# Patient Record
Sex: MALE | Race: WHITE | URBAN - METROPOLITAN AREA
[De-identification: names, ages, dates, MRNs, and addresses within clinical notes are randomized per-mention and may not be internally consistent; named-entity substitution may affect disease eponyms.]

---

## 2023-04-01 ENCOUNTER — CLAIMS CREATED FROM THE CLAIM WINDOW (OUTPATIENT)
Dept: URBAN - METROPOLITAN AREA MEDICAL CENTER 31 | Facility: MEDICAL CENTER | Age: 78
End: 2023-04-01

## 2023-04-01 ENCOUNTER — CLAIMS CREATED FROM THE CLAIM WINDOW (OUTPATIENT)
Dept: URBAN - METROPOLITAN AREA MEDICAL CENTER 31 | Facility: MEDICAL CENTER | Age: 78
End: 2023-04-01
Payer: MEDICARE

## 2023-04-01 DIAGNOSIS — K81.0 ACALCULOUS CHOLECYSTITIS: ICD-10-CM

## 2023-04-01 DIAGNOSIS — R10.84 ABDOMINAL CRAMPING, GENERALIZED: ICD-10-CM

## 2023-04-01 PROCEDURE — G8427 DOCREV CUR MEDS BY ELIG CLIN: HCPCS | Performed by: STUDENT IN AN ORGANIZED HEALTH CARE EDUCATION/TRAINING PROGRAM

## 2023-04-01 PROCEDURE — 99222 1ST HOSP IP/OBS MODERATE 55: CPT | Performed by: STUDENT IN AN ORGANIZED HEALTH CARE EDUCATION/TRAINING PROGRAM

## 2023-04-02 ENCOUNTER — CLAIMS CREATED FROM THE CLAIM WINDOW (OUTPATIENT)
Dept: URBAN - METROPOLITAN AREA MEDICAL CENTER 31 | Facility: MEDICAL CENTER | Age: 78
End: 2023-04-02

## 2023-04-02 ENCOUNTER — CLAIMS CREATED FROM THE CLAIM WINDOW (OUTPATIENT)
Dept: URBAN - METROPOLITAN AREA MEDICAL CENTER 31 | Facility: MEDICAL CENTER | Age: 78
End: 2023-04-02
Payer: MEDICARE

## 2023-04-02 DIAGNOSIS — K26.4 BLEEDING DUODENAL ULCER: ICD-10-CM

## 2023-04-02 DIAGNOSIS — K83.1 AMPULLA OF VATER OBSTRUCTION SYNDROME: ICD-10-CM

## 2023-04-02 PROCEDURE — 43261 ENDO CHOLANGIOPANCREATOGRAPH: CPT | Performed by: INTERNAL MEDICINE

## 2023-04-03 ENCOUNTER — CLAIMS CREATED FROM THE CLAIM WINDOW (OUTPATIENT)
Dept: URBAN - METROPOLITAN AREA MEDICAL CENTER 31 | Facility: MEDICAL CENTER | Age: 78
End: 2023-04-03

## 2023-04-03 ENCOUNTER — CLAIMS CREATED FROM THE CLAIM WINDOW (OUTPATIENT)
Dept: URBAN - METROPOLITAN AREA MEDICAL CENTER 31 | Facility: MEDICAL CENTER | Age: 78
End: 2023-04-03
Payer: MEDICARE

## 2023-04-03 DIAGNOSIS — R10.13 ABDOMINAL DISCOMFORT, EPIGASTRIC: ICD-10-CM

## 2023-04-03 DIAGNOSIS — K81.0 ACUTE CHOLECYSTITIS: ICD-10-CM

## 2023-04-03 DIAGNOSIS — R74.8 ABNORMAL ALKALINE PHOSPHATASE TEST: ICD-10-CM

## 2023-04-03 PROCEDURE — 99232 SBSQ HOSP IP/OBS MODERATE 35: CPT | Performed by: PHYSICIAN ASSISTANT

## 2023-05-25 ENCOUNTER — LAB OUTSIDE AN ENCOUNTER (OUTPATIENT)
Dept: URBAN - METROPOLITAN AREA CLINIC 115 | Facility: CLINIC | Age: 78
End: 2023-05-25

## 2023-05-25 ENCOUNTER — OFFICE VISIT (OUTPATIENT)
Dept: URBAN - METROPOLITAN AREA CLINIC 115 | Facility: CLINIC | Age: 78
End: 2023-05-25
Payer: MEDICARE

## 2023-05-25 VITALS
DIASTOLIC BLOOD PRESSURE: 78 MMHG | SYSTOLIC BLOOD PRESSURE: 143 MMHG | WEIGHT: 214.2 LBS | BODY MASS INDEX: 32.46 KG/M2 | HEIGHT: 68 IN | HEART RATE: 68 BPM | TEMPERATURE: 97.8 F

## 2023-05-25 DIAGNOSIS — K26.9 DUODENAL ULCER: ICD-10-CM

## 2023-05-25 DIAGNOSIS — K27.9 PEPTIC ULCER: ICD-10-CM

## 2023-05-25 DIAGNOSIS — R19.7 DIARRHEA, UNSPECIFIED TYPE: ICD-10-CM

## 2023-05-25 PROBLEM — 13200003: Status: ACTIVE | Noted: 2023-05-25

## 2023-05-25 PROCEDURE — G8427 DOCREV CUR MEDS BY ELIG CLIN: HCPCS | Performed by: INTERNAL MEDICINE

## 2023-05-25 PROCEDURE — 99204 OFFICE O/P NEW MOD 45 MIN: CPT | Performed by: INTERNAL MEDICINE

## 2023-05-25 PROCEDURE — G8417 CALC BMI ABV UP PARAM F/U: HCPCS | Performed by: INTERNAL MEDICINE

## 2023-05-25 PROCEDURE — G9903 PT SCRN TBCO ID AS NON USER: HCPCS | Performed by: INTERNAL MEDICINE

## 2023-05-25 RX ORDER — AMLODIPINE BESYLATE 5 MG/1
1 TABLET TABLET ORAL ONCE A DAY
Status: ACTIVE | COMMUNITY

## 2023-05-25 RX ORDER — LOSARTAN POTASSIUM 100 MG/1
1 TABLET TABLET ORAL ONCE A DAY
Status: ACTIVE | COMMUNITY

## 2023-05-25 RX ORDER — OXYCODONE HYDROCHLORIDE AND ACETAMINOPHEN 10; 325 MG/1; MG/1
1 TABLET AS NEEDED TABLET ORAL
Status: ACTIVE | COMMUNITY

## 2023-05-25 RX ORDER — ATENOLOL 50 MG/1
1 TABLET TABLET ORAL ONCE A DAY
Status: ACTIVE | COMMUNITY

## 2023-05-25 RX ORDER — ROSUVASTATIN CALCIUM 20 MG/1
1 TABLET TABLET, FILM COATED ORAL ONCE A DAY
Status: ACTIVE | COMMUNITY

## 2023-05-25 RX ORDER — EMPAGLIFLOZIN 25 MG/1
1 TABLET TABLET, FILM COATED ORAL ONCE A DAY
Status: ACTIVE | COMMUNITY

## 2023-05-25 RX ORDER — FENOFIBRATE 160 MG/1
1 TABLET TABLET ORAL ONCE A DAY
Status: ACTIVE | COMMUNITY

## 2023-05-25 RX ORDER — CHOLESTYRAMINE 4 G/9G
1 PACKET MIXED WITH WATER OR NON-CARBONATED DRINK POWDER, FOR SUSPENSION ORAL ONCE A DAY
Qty: 90 | Refills: 1 | OUTPATIENT
Start: 2023-05-25

## 2023-05-25 RX ORDER — GLIPIZIDE 10 MG/1
1 TABLET 30 MINUTES BEFORE BREAKFAST TABLET ORAL ONCE A DAY
Status: ACTIVE | COMMUNITY

## 2023-05-25 RX ORDER — UBIDECARENONE 100 MG
AS DIRECTED CAPSULE ORAL
Status: ACTIVE | COMMUNITY

## 2023-05-25 RX ORDER — PANTOPRAZOLE SODIUM 40 MG/1
1 TABLET TABLET, DELAYED RELEASE ORAL TWICE A DAY
Qty: 180 TABLET | Refills: 1 | OUTPATIENT
Start: 2023-05-25

## 2023-05-25 RX ORDER — INSULIN GLARGINE AND LIXISENATIDE 100; 33 U/ML; UG/ML
AS DIRECTED INJECTION, SOLUTION SUBCUTANEOUS
Status: ACTIVE | COMMUNITY

## 2023-05-25 RX ORDER — TAMSULOSIN HYDROCHLORIDE 0.4 MG/1
1 CAPSULE CAPSULE ORAL ONCE A DAY
Status: ACTIVE | COMMUNITY

## 2023-05-25 RX ORDER — METFORMIN HYDROCHLORIDE 1000 MG/1
1 TABLET WITH A MEAL TABLET, FILM COATED ORAL ONCE A DAY
Status: ACTIVE | COMMUNITY

## 2023-05-25 NOTE — HPI-TODAY'S VISIT:
5/25/2023 Patient is a 77 year old  male who presents for a peptic ulcer. Patient states that he is doing fine and denies any stomach pain or any other issues. Patient states that after the hospital, he has felt a lot better. Patient states that he only had 10 days worth of pantoprazole after being at the hospital and that he has not had any since. Patient's wife states that after leaving the hospital he had a diarrhea for a couple of days. Patient has carpal tunnel. He denies having a colonoscopy in the past.

## 2023-06-01 LAB
A/G RATIO: 2
ABSOLUTE BASOPHILS: 83
ABSOLUTE EOSINOPHILS: 138
ABSOLUTE LYMPHOCYTES: 2008
ABSOLUTE MONOCYTES: 718
ABSOLUTE NEUTROPHILS: 3954
ALBUMIN: 4.3
ALKALINE PHOSPHATASE: 38
ALT (SGPT): 27
AST (SGOT): 15
BASOPHILS: 1.2
BILIRUBIN, TOTAL: 0.4
BUN/CREATININE RATIO: (no result)
BUN: 16
CALCIUM: 9.4
CARBON DIOXIDE, TOTAL: 25
CHLORIDE: 104
CREATININE: 0.8
EGFR: 92
EOSINOPHILS: 2
GASTRIN, SERUM: 34
GLOBULIN, TOTAL: 2.2
GLUCOSE: 110
HEMATOCRIT: 42.9
HEMOGLOBIN: 14.5
LYMPHOCYTES: 29.1
MCH: 30.7
MCHC: 33.8
MCV: 90.9
MONOCYTES: 10.4
MPV: 10.3
NEUTROPHILS: 57.3
PLATELET COUNT: 233
POTASSIUM: 4.2
PROTEIN, TOTAL: 6.5
RDW: 12.6
RED BLOOD CELL COUNT: 4.72
SODIUM: 140
WHITE BLOOD CELL COUNT: 6.9

## 2023-06-13 ENCOUNTER — OFFICE VISIT (OUTPATIENT)
Dept: URBAN - METROPOLITAN AREA MEDICAL CENTER 24 | Facility: MEDICAL CENTER | Age: 78
End: 2023-06-13
Payer: MEDICARE

## 2023-06-13 DIAGNOSIS — K83.8 ACQUIRED DILATION OF BILE DUCT: ICD-10-CM

## 2023-06-13 PROCEDURE — 43262 ENDO CHOLANGIOPANCREATOGRAPH: CPT | Performed by: INTERNAL MEDICINE

## 2023-06-13 PROCEDURE — 43264 ERCP REMOVE DUCT CALCULI: CPT | Performed by: INTERNAL MEDICINE

## 2023-07-13 ENCOUNTER — OFFICE VISIT (OUTPATIENT)
Dept: URBAN - METROPOLITAN AREA CLINIC 115 | Facility: CLINIC | Age: 78
End: 2023-07-13
Payer: MEDICARE

## 2023-07-13 ENCOUNTER — WEB ENCOUNTER (OUTPATIENT)
Dept: URBAN - METROPOLITAN AREA CLINIC 115 | Facility: CLINIC | Age: 78
End: 2023-07-13

## 2023-07-13 VITALS
SYSTOLIC BLOOD PRESSURE: 135 MMHG | HEART RATE: 76 BPM | BODY MASS INDEX: 32.46 KG/M2 | HEIGHT: 68 IN | WEIGHT: 214.2 LBS | DIASTOLIC BLOOD PRESSURE: 71 MMHG | TEMPERATURE: 97.7 F

## 2023-07-13 DIAGNOSIS — R19.7 DIARRHEA, UNSPECIFIED TYPE: ICD-10-CM

## 2023-07-13 DIAGNOSIS — K26.9 DUODENAL ULCER: ICD-10-CM

## 2023-07-13 PROCEDURE — 99214 OFFICE O/P EST MOD 30 MIN: CPT | Performed by: INTERNAL MEDICINE

## 2023-07-13 RX ORDER — ROSUVASTATIN CALCIUM 20 MG/1
1 TABLET TABLET, FILM COATED ORAL ONCE A DAY
Status: ACTIVE | COMMUNITY

## 2023-07-13 RX ORDER — TAMSULOSIN HYDROCHLORIDE 0.4 MG/1
1 CAPSULE CAPSULE ORAL ONCE A DAY
Status: ACTIVE | COMMUNITY

## 2023-07-13 RX ORDER — UBIDECARENONE 100 MG
AS DIRECTED CAPSULE ORAL
Status: ACTIVE | COMMUNITY

## 2023-07-13 RX ORDER — PANTOPRAZOLE SODIUM 40 MG/1
1 TABLET TABLET, DELAYED RELEASE ORAL ONCE A DAY
Qty: 90 | Refills: 1 | OUTPATIENT
Start: 2023-07-13

## 2023-07-13 RX ORDER — INSULIN GLARGINE AND LIXISENATIDE 100; 33 U/ML; UG/ML
AS DIRECTED INJECTION, SOLUTION SUBCUTANEOUS
Status: ACTIVE | COMMUNITY

## 2023-07-13 RX ORDER — METFORMIN HYDROCHLORIDE 1000 MG/1
1 TABLET WITH A MEAL TABLET, FILM COATED ORAL ONCE A DAY
Status: ACTIVE | COMMUNITY

## 2023-07-13 RX ORDER — PANTOPRAZOLE SODIUM 40 MG/1
1 TABLET TABLET, DELAYED RELEASE ORAL TWICE A DAY
Qty: 180 TABLET | Refills: 1 | Status: ACTIVE | COMMUNITY
Start: 2023-05-25

## 2023-07-13 RX ORDER — LOSARTAN POTASSIUM 100 MG/1
1 TABLET TABLET ORAL ONCE A DAY
Status: ACTIVE | COMMUNITY

## 2023-07-13 RX ORDER — FENOFIBRATE 160 MG/1
1 TABLET TABLET ORAL ONCE A DAY
Status: ACTIVE | COMMUNITY

## 2023-07-13 RX ORDER — OXYCODONE HYDROCHLORIDE AND ACETAMINOPHEN 10; 325 MG/1; MG/1
1 TABLET AS NEEDED TABLET ORAL
Status: ACTIVE | COMMUNITY

## 2023-07-13 RX ORDER — AMLODIPINE BESYLATE 5 MG/1
1 TABLET TABLET ORAL ONCE A DAY
Status: ACTIVE | COMMUNITY

## 2023-07-13 RX ORDER — CHOLESTYRAMINE 4 G/9G
1 PACKET MIXED WITH WATER OR NON-CARBONATED DRINK POWDER, FOR SUSPENSION ORAL ONCE A DAY
Qty: 90 | Refills: 1 | Status: ACTIVE | COMMUNITY
Start: 2023-05-25

## 2023-07-13 RX ORDER — ATENOLOL 50 MG/1
1 TABLET TABLET ORAL ONCE A DAY
Status: ACTIVE | COMMUNITY

## 2023-07-13 RX ORDER — GLIPIZIDE 10 MG/1
1 TABLET 30 MINUTES BEFORE BREAKFAST TABLET ORAL ONCE A DAY
Status: ACTIVE | COMMUNITY

## 2023-07-13 RX ORDER — EMPAGLIFLOZIN 25 MG/1
1 TABLET TABLET, FILM COATED ORAL ONCE A DAY
Status: ACTIVE | COMMUNITY

## 2023-07-13 NOTE — HPI-TODAY'S VISIT:
5/25/2023 Patient is a 77 year old  male who presents for a peptic ulcer. Patient states that he is doing fine and denies any stomach pain or any other issues. Patient states that after the hospital, he has felt a lot better. Patient states that he only had 10 days worth of pantoprazole after being at the hospital and that he has not had any since. Patient's wife states that after leaving the hospital he had a diarrhea for a couple of days. Patient has carpal tunnel. He denies having a colonoscopy in the past. 7/13/2023 Patient came for follow-up today denies having abdominal pain denies of any bloating denies of any melena hematochezia able to tolerate the diet no weight loss he underwent ERCP last month the previously noted ulcer healed.  He denies of any chronic NSAID use.  Diarrhea also improved.

## 2023-10-10 ENCOUNTER — TELEPHONE ENCOUNTER (OUTPATIENT)
Dept: URBAN - METROPOLITAN AREA CLINIC 115 | Facility: CLINIC | Age: 78
End: 2023-10-10

## 2024-01-11 ENCOUNTER — OFFICE VISIT (OUTPATIENT)
Dept: URBAN - METROPOLITAN AREA CLINIC 115 | Facility: CLINIC | Age: 79
End: 2024-01-11
Payer: MEDICARE

## 2024-01-11 ENCOUNTER — DASHBOARD ENCOUNTERS (OUTPATIENT)
Age: 79
End: 2024-01-11

## 2024-01-11 VITALS
BODY MASS INDEX: 32.01 KG/M2 | HEART RATE: 91 BPM | SYSTOLIC BLOOD PRESSURE: 150 MMHG | WEIGHT: 211.2 LBS | DIASTOLIC BLOOD PRESSURE: 71 MMHG | TEMPERATURE: 98.1 F | HEIGHT: 68 IN

## 2024-01-11 DIAGNOSIS — K26.9 DUODENAL ULCER: ICD-10-CM

## 2024-01-11 DIAGNOSIS — R19.7 DIARRHEA, UNSPECIFIED TYPE: ICD-10-CM

## 2024-01-11 PROBLEM — 51868009: Status: ACTIVE | Noted: 2023-05-25

## 2024-01-11 PROCEDURE — 99214 OFFICE O/P EST MOD 30 MIN: CPT | Performed by: INTERNAL MEDICINE

## 2024-01-11 RX ORDER — LOSARTAN POTASSIUM 100 MG/1
1 TABLET TABLET ORAL ONCE A DAY
Status: ACTIVE | COMMUNITY

## 2024-01-11 RX ORDER — FENOFIBRATE 160 MG/1
1 TABLET TABLET ORAL ONCE A DAY
Status: ACTIVE | COMMUNITY

## 2024-01-11 RX ORDER — ATENOLOL 50 MG/1
1 TABLET TABLET ORAL ONCE A DAY
Status: ACTIVE | COMMUNITY

## 2024-01-11 RX ORDER — OXYCODONE HYDROCHLORIDE AND ACETAMINOPHEN 10; 325 MG/1; MG/1
1 TABLET AS NEEDED TABLET ORAL
Status: ACTIVE | COMMUNITY

## 2024-01-11 RX ORDER — GLIPIZIDE 10 MG/1
1 TABLET 30 MINUTES BEFORE BREAKFAST TABLET ORAL ONCE A DAY
Status: ACTIVE | COMMUNITY

## 2024-01-11 RX ORDER — AMLODIPINE BESYLATE 5 MG/1
1 TABLET TABLET ORAL ONCE A DAY
Status: ACTIVE | COMMUNITY

## 2024-01-11 RX ORDER — EMPAGLIFLOZIN 25 MG/1
1 TABLET TABLET, FILM COATED ORAL ONCE A DAY
Status: ACTIVE | COMMUNITY

## 2024-01-11 RX ORDER — UBIDECARENONE 100 MG
AS DIRECTED CAPSULE ORAL
Status: ACTIVE | COMMUNITY

## 2024-01-11 RX ORDER — CHOLESTYRAMINE 4 G/9G
1 PACKET MIXED WITH WATER OR NON-CARBONATED DRINK POWDER, FOR SUSPENSION ORAL ONCE A DAY
Qty: 90 | Refills: 1 | Status: ACTIVE | COMMUNITY
Start: 2023-05-25

## 2024-01-11 RX ORDER — ROSUVASTATIN CALCIUM 20 MG/1
1 TABLET TABLET, FILM COATED ORAL ONCE A DAY
Status: ACTIVE | COMMUNITY

## 2024-01-11 RX ORDER — METFORMIN HYDROCHLORIDE 1000 MG/1
1 TABLET WITH A MEAL TABLET, FILM COATED ORAL ONCE A DAY
Status: ACTIVE | COMMUNITY

## 2024-01-11 RX ORDER — PANTOPRAZOLE SODIUM 40 MG/1
1 TABLET TABLET, DELAYED RELEASE ORAL TWICE A DAY
Qty: 180 TABLET | Refills: 1 | Status: ACTIVE | COMMUNITY
Start: 2023-05-25

## 2024-01-11 RX ORDER — PANTOPRAZOLE SODIUM 40 MG/1
1 TABLET TABLET, DELAYED RELEASE ORAL ONCE A DAY
Qty: 90 | Refills: 0 | OUTPATIENT
Start: 2024-01-11

## 2024-01-11 RX ORDER — TAMSULOSIN HYDROCHLORIDE 0.4 MG/1
1 CAPSULE CAPSULE ORAL ONCE A DAY
Status: ACTIVE | COMMUNITY

## 2024-01-11 RX ORDER — PANTOPRAZOLE SODIUM 40 MG/1
1 TABLET TABLET, DELAYED RELEASE ORAL ONCE A DAY
Qty: 90 | Refills: 1 | Status: ACTIVE | COMMUNITY
Start: 2023-07-13

## 2024-01-11 RX ORDER — INSULIN GLARGINE AND LIXISENATIDE 100; 33 U/ML; UG/ML
AS DIRECTED INJECTION, SOLUTION SUBCUTANEOUS
Status: ACTIVE | COMMUNITY

## 2024-01-11 NOTE — HPI-TODAY'S VISIT:
5/25/2023 Patient is a 77 year old  male who presents for a peptic ulcer. Patient states that he is doing fine and denies any stomach pain or any other issues. Patient states that after the hospital, he has felt a lot better. Patient states that he only had 10 days worth of pantoprazole after being at the hospital and that he has not had any since. Patient's wife states that after leaving the hospital he had a diarrhea for a couple of days. Patient has carpal tunnel. He denies having a colonoscopy in the past.  7/13/2023 Patient came for follow-up today denies having abdominal pain denies of any bloating denies of any melena hematochezia able to tolerate the diet no weight loss he underwent ERCP last month the previously noted ulcer healed.  He denies of any chronic NSAID use.  Diarrhea also improved.  1/11/2024 Patient presents for a follow up. Patient denies any stomach issues and denies blood in the stool or melena. Patient also states any issues with indigestion. He deneis taking any NSAIDs. Patient denies any new symptoms. He is still on pantoprazole. He denies having a colonoscopy in the past and he had a cologuard test last year which he states was normal.

## 2024-01-17 LAB
A/G RATIO: 2.4
ABSOLUTE BASOPHILS: 90
ABSOLUTE EOSINOPHILS: 180
ABSOLUTE LYMPHOCYTES: 1583
ABSOLUTE MONOCYTES: 771
ABSOLUTE NEUTROPHILS: 5576
ALBUMIN: 4.5
ALKALINE PHOSPHATASE: 58
ALT (SGPT): 22
AST (SGOT): 15
BASOPHILS: 1.1
BILIRUBIN, TOTAL: 0.4
BUN/CREATININE RATIO: 33
BUN: 26
CALCIUM: 9.6
CARBON DIOXIDE, TOTAL: 27
CHLORIDE: 102
CREATININE: 0.8
EGFR: 91
EOSINOPHILS: 2.2
GLOBULIN, TOTAL: 1.9
GLUCOSE: 157
HEMATOCRIT: 44.4
HEMOGLOBIN: 14.7
LYMPHOCYTES: 19.3
MCH: 30.5
MCHC: 33.1
MCV: 92.1
MONOCYTES: 9.4
MPV: 11.2
NEUTROPHILS: 68
PLATELET COUNT: 193
POTASSIUM: 4.3
PROTEIN, TOTAL: 6.4
RDW: 12.1
RED BLOOD CELL COUNT: 4.82
SODIUM: 140
WHITE BLOOD CELL COUNT: 8.2

## 2024-07-11 ENCOUNTER — OFFICE VISIT (OUTPATIENT)
Dept: URBAN - METROPOLITAN AREA CLINIC 115 | Facility: CLINIC | Age: 79
End: 2024-07-11
Payer: MEDICARE

## 2024-07-11 ENCOUNTER — OFFICE VISIT (OUTPATIENT)
Dept: URBAN - METROPOLITAN AREA CLINIC 115 | Facility: CLINIC | Age: 79
End: 2024-07-11

## 2024-07-11 VITALS
DIASTOLIC BLOOD PRESSURE: 74 MMHG | HEIGHT: 68 IN | TEMPERATURE: 97.4 F | BODY MASS INDEX: 31.25 KG/M2 | WEIGHT: 206.2 LBS | HEART RATE: 87 BPM | SYSTOLIC BLOOD PRESSURE: 119 MMHG

## 2024-07-11 DIAGNOSIS — R19.7 DIARRHEA, UNSPECIFIED TYPE: ICD-10-CM

## 2024-07-11 DIAGNOSIS — K27.9 PEPTIC ULCER: ICD-10-CM

## 2024-07-11 DIAGNOSIS — K26.9 DUODENAL ULCER: ICD-10-CM

## 2024-07-11 PROCEDURE — 99214 OFFICE O/P EST MOD 30 MIN: CPT | Performed by: INTERNAL MEDICINE

## 2024-07-11 RX ORDER — ROSUVASTATIN 20 MG/1
1 TABLET TABLET, FILM COATED ORAL ONCE A DAY
Status: ACTIVE | COMMUNITY

## 2024-07-11 RX ORDER — UBIDECARENONE 100 MG
AS DIRECTED CAPSULE ORAL
Status: ACTIVE | COMMUNITY

## 2024-07-11 RX ORDER — ATENOLOL 50 MG/1
1 TABLET TABLET ORAL ONCE A DAY
Status: ACTIVE | COMMUNITY

## 2024-07-11 RX ORDER — OXYCODONE HYDROCHLORIDE AND ACETAMINOPHEN 10; 325 MG/1; MG/1
1 TABLET AS NEEDED TABLET ORAL
Status: ACTIVE | COMMUNITY

## 2024-07-11 RX ORDER — LOSARTAN POTASSIUM 100 MG/1
1 TABLET TABLET ORAL ONCE A DAY
Status: ACTIVE | COMMUNITY

## 2024-07-11 RX ORDER — FENOFIBRATE 160 MG/1
1 TABLET TABLET ORAL ONCE A DAY
Status: ACTIVE | COMMUNITY

## 2024-07-11 RX ORDER — PANTOPRAZOLE SODIUM 40 MG/1
1 TABLET TABLET, DELAYED RELEASE ORAL TWICE A DAY
Qty: 180 TABLET | Refills: 1 | Status: ACTIVE | COMMUNITY
Start: 2023-05-25

## 2024-07-11 RX ORDER — PANTOPRAZOLE SODIUM 40 MG/1
1 TABLET TABLET, DELAYED RELEASE ORAL ONCE A DAY
Qty: 90 TABLET | Refills: 3 | OUTPATIENT
Start: 2024-07-11

## 2024-07-11 RX ORDER — CHOLESTYRAMINE 4 G/9G
1 PACKET MIXED WITH WATER OR NON-CARBONATED DRINK POWDER, FOR SUSPENSION ORAL ONCE A DAY
Qty: 90 | Refills: 1 | Status: ACTIVE | COMMUNITY
Start: 2023-05-25

## 2024-07-11 RX ORDER — TAMSULOSIN HYDROCHLORIDE 0.4 MG/1
1 CAPSULE CAPSULE ORAL ONCE A DAY
Status: ACTIVE | COMMUNITY

## 2024-07-11 RX ORDER — PANTOPRAZOLE SODIUM 40 MG/1
1 TABLET TABLET, DELAYED RELEASE ORAL ONCE A DAY
Qty: 90 | Refills: 1 | Status: ACTIVE | COMMUNITY
Start: 2023-07-13

## 2024-07-11 RX ORDER — AMLODIPINE BESYLATE 5 MG/1
1 TABLET TABLET ORAL ONCE A DAY
Status: ACTIVE | COMMUNITY

## 2024-07-11 RX ORDER — INSULIN GLARGINE AND LIXISENATIDE 100; 33 U/ML; UG/ML
AS DIRECTED INJECTION, SOLUTION SUBCUTANEOUS
Status: ACTIVE | COMMUNITY

## 2024-07-11 RX ORDER — PANTOPRAZOLE SODIUM 40 MG/1
1 TABLET TABLET, DELAYED RELEASE ORAL ONCE A DAY
Qty: 90 | Refills: 0 | Status: ACTIVE | COMMUNITY
Start: 2024-01-11

## 2024-07-11 RX ORDER — EMPAGLIFLOZIN 25 MG/1
1 TABLET TABLET, FILM COATED ORAL ONCE A DAY
Status: ACTIVE | COMMUNITY

## 2024-07-11 RX ORDER — GLIPIZIDE 10 MG/1
1 TABLET 30 MINUTES BEFORE BREAKFAST TABLET ORAL ONCE A DAY
Status: ACTIVE | COMMUNITY

## 2024-07-11 RX ORDER — METFORMIN HYDROCHLORIDE 1000 MG/1
1 TABLET WITH A MEAL TABLET, FILM COATED ORAL ONCE A DAY
Status: ACTIVE | COMMUNITY

## 2024-07-11 NOTE — HPI-TODAY'S VISIT:
5/25/2023 Patient is a 77 year old  male who presents for a peptic ulcer. Patient states that he is doing fine and denies any stomach pain or any other issues. Patient states that after the hospital, he has felt a lot better. Patient states that he only had 10 days worth of pantoprazole after being at the hospital and that he has not had any since. Patient's wife states that after leaving the hospital he had a diarrhea for a couple of days. Patient has carpal tunnel. He denies having a colonoscopy in the past.  7/13/2023 Patient came for follow-up today denies having abdominal pain denies of any bloating denies of any melena hematochezia able to tolerate the diet no weight loss he underwent ERCP last month the previously noted ulcer healed.  He denies of any chronic NSAID use.  Diarrhea also improved.  1/11/2024 Patient presents for a follow up. Patient denies any stomach issues and denies blood in the stool or melena. Patient also states any issues with indigestion. He deneis taking any NSAIDs. Patient denies any new symptoms. He is still on pantoprazole. He denies having a colonoscopy in the past and he had a cologuard test last year which he states was normal.  7/11/2024 Patient presents for a 6 month follow up. Patient denies stomach pain or blood in the stool.

## 2024-08-01 ENCOUNTER — OFFICE VISIT (OUTPATIENT)
Dept: URBAN - METROPOLITAN AREA CLINIC 115 | Facility: CLINIC | Age: 79
End: 2024-08-01

## 2025-05-19 ENCOUNTER — ERX REFILL RESPONSE (OUTPATIENT)
Dept: URBAN - METROPOLITAN AREA CLINIC 82 | Facility: CLINIC | Age: 80
End: 2025-05-19

## 2025-05-19 RX ORDER — PANTOPRAZOLE SODIUM 40 MG/1
TAKE 1 TABLET ONCE DAILY TABLET, DELAYED RELEASE ORAL
Qty: 90 TABLET | Refills: 3 | OUTPATIENT

## 2025-05-19 RX ORDER — PANTOPRAZOLE SODIUM 40 MG/1
1 TABLET TABLET, DELAYED RELEASE ORAL ONCE A DAY
Qty: 90 TABLET | Refills: 3 | OUTPATIENT